# Patient Record
Sex: MALE | Race: WHITE | NOT HISPANIC OR LATINO | Employment: OTHER | URBAN - METROPOLITAN AREA
[De-identification: names, ages, dates, MRNs, and addresses within clinical notes are randomized per-mention and may not be internally consistent; named-entity substitution may affect disease eponyms.]

---

## 2021-11-13 ENCOUNTER — OUTSIDE FACILITY SERVICE (OUTPATIENT)
Dept: CARDIOLOGY | Facility: CLINIC | Age: 56
End: 2021-11-13

## 2021-11-13 PROCEDURE — 93306 TTE W/DOPPLER COMPLETE: CPT | Performed by: INTERNAL MEDICINE

## 2022-05-09 ENCOUNTER — OFFICE VISIT (OUTPATIENT)
Dept: SURGERY | Facility: CLINIC | Age: 57
End: 2022-05-09

## 2022-05-09 VITALS
HEIGHT: 68 IN | WEIGHT: 157.6 LBS | TEMPERATURE: 98.1 F | OXYGEN SATURATION: 98 % | DIASTOLIC BLOOD PRESSURE: 66 MMHG | SYSTOLIC BLOOD PRESSURE: 102 MMHG | HEART RATE: 84 BPM | BODY MASS INDEX: 23.89 KG/M2

## 2022-05-09 DIAGNOSIS — L72.3 INFLAMED SEBACEOUS CYST: Primary | ICD-10-CM

## 2022-05-09 DIAGNOSIS — K43.2 INCISIONAL HERNIA, WITHOUT OBSTRUCTION OR GANGRENE: ICD-10-CM

## 2022-05-09 PROCEDURE — 99203 OFFICE O/P NEW LOW 30 MIN: CPT | Performed by: SURGERY

## 2022-05-09 PROCEDURE — 12032 INTMD RPR S/A/T/EXT 2.6-7.5: CPT | Performed by: SURGERY

## 2022-05-09 PROCEDURE — 11403 EXC TR-EXT B9+MARG 2.1-3CM: CPT | Performed by: SURGERY

## 2022-05-09 RX ORDER — GABAPENTIN 300 MG/1
CAPSULE ORAL
COMMUNITY
Start: 2022-04-19

## 2022-05-09 RX ORDER — FUROSEMIDE 20 MG/1
60 TABLET ORAL DAILY
COMMUNITY
Start: 2022-03-21

## 2022-05-09 RX ORDER — SPIRONOLACTONE 50 MG/1
TABLET, FILM COATED ORAL
COMMUNITY
Start: 2022-04-19

## 2022-05-09 NOTE — PROGRESS NOTES
Patient: Ray Caraballo    YOB: 1965    Date: 05/09/2022    Primary Care Provider: Rupa Russell PA    Chief Complaint   Patient presents with   • Cyst     back       Subjective .     History of present illness: Patient with a several year history of a right valve sebaceous cyst.  Recently it has become aimed at drains every few days.  Causes him some pain.  Patient also with a periumbilical hernia.  He is not complaining of any pain relating to this.  Not having any issues.  Has had this for several years without significant change.    The following portions of the patient's history were reviewed and updated as appropriate: allergies, current medications, past family history, past medical history, past social history, past surgical history and problem list.    Review of Systems   Constitutional: Negative for activity change, chills, fever and unexpected weight change.   HENT: Negative for hearing loss, trouble swallowing and voice change.    Eyes: Negative for visual disturbance.   Respiratory: Negative for apnea, cough, chest tightness, shortness of breath and wheezing.    Cardiovascular: Negative for chest pain, palpitations and leg swelling.   Gastrointestinal: Negative for abdominal distention, abdominal pain, anal bleeding, blood in stool, constipation, diarrhea, nausea, rectal pain and vomiting.   Endocrine: Negative for cold intolerance and heat intolerance.   Genitourinary: Negative for difficulty urinating, dysuria and flank pain.   Musculoskeletal: Negative for back pain and gait problem.   Skin: Positive for color change and wound. Negative for rash.   Neurological: Negative for dizziness, syncope, speech difficulty, weakness, light-headedness, numbness and headaches.   Hematological: Negative for adenopathy. Does not bruise/bleed easily.   Psychiatric/Behavioral: Negative for confusion. The patient is not nervous/anxious.        History:  No past medical history on file.    No  "past surgical history on file.    No family history on file.    Social History     Tobacco Use   • Smoking status: Current Every Day Smoker     Types: Cigarettes   • Smokeless tobacco: Never Used   Substance Use Topics   • Alcohol use: Not Currently   • Drug use: Never        Allergies:  Allergies   Allergen Reactions   • Motrin [Ibuprofen] Rash       Medications:    Current Outpatient Medications:   •  furosemide (LASIX) 20 MG tablet, Take 60 mg by mouth Daily., Disp: , Rfl:   •  gabapentin (NEURONTIN) 300 MG capsule, TAKE ONE CAPSULE TWICE DAILY FOR 30 DAY supply, Disp: , Rfl:   •  spironolactone (ALDACTONE) 50 MG tablet, TAKE 3 TABLETS DAILY FOR ONE TIME DOSE, Disp: , Rfl:     Objective     Vital Signs:   Vitals:    05/09/22 1420   BP: 102/66   Pulse: 84   Temp: 98.1 °F (36.7 °C)   SpO2: 98%   Weight: 71.5 kg (157 lb 9.6 oz)   Height: 172.7 cm (68\")       Physical Exam:  General Appearance:    Alert, cooperative, in no acute distress   Head:    Normocephalic, without obvious abnormality, atraumatic   Eyes:            Normal.  No scleral icterus.  PERRLA    Lungs:     Clear to auscultation,respirations regular, even and                  unlabored    Heart:    Regular rhythm and normal rate, normal S1 and S2,   Abdomen:    Soft and nontender.  Incisional periumbilical hernia that is easily reducible relatively small with omental contents.   Extremities:   Moves all extremities well, no edema, no cyanosis, no             redness   Skin:  3 cm inflamed sebaceous cyst of the right mid back   Neurologic:   Normal without gross deficits.   Psychiatric: No evidence of depression or anxiety          Results Review:   None    Review of Systems was reviewed and confirmed as accurate as documented by the MA.    Assessment/Plan     1. Inflamed sebaceous cyst    2. Incisional hernia, without obstruction or gangrene      Patient with incisional periumbilical hernia.  Relatively small currently.  I explained to him the risks of " this hernia which includes intestinal incarceration/strangulation.  This is a small risk currently given its size.  I offered him repair of the hernia.  For now he wishes to observe and I think this is reasonable since it is asymptomatic.  He understands to follow-up with us if it begins to hurt, significantly increases in size or he is unable to reduce the hernia.    Inflamed sebaceous cyst of the back.  I offered to remove this for him as this will not resolve on its own.  He understands the procedure as well as the risk of bleeding and infection and he wishes to proceed.      1% lidocaine was used locally after the area was prepped and draped in usual fashion.  An elliptical incision was then made around the cyst and full-thickness excision was performed.  Any inflammatory tissue was also excised.  Excision measured 2.3 cm.  The wound was partially closed in layers using interrupted deep dermal Vicryl closure and interrupted simple nylon closure for the skin and measured 3 cm in length.  It was left open in the very inferior portion for drainage.  Patient tolerated procedure well there were no complications.  Wound care instructions were given.  There was minimal blood loss and hemostasis had been well controlled.  Follow-up in 1 week.    Electronically signed by Dorian Albarran MD  05/09/22  14:29 EDT

## 2022-05-13 NOTE — PROGRESS NOTES
"Patient: Ray Caraballo    YOB: 1965    Date: 05/16/2022    Primary Care Provider: Rupa Russell PA    Chief Complaint   Patient presents with   • Post-op Follow-up     Back excision       History of present illness: Patient doing well after his procedure.  No complaints.  No pain.  Feels much better than prior to excision    Vital Signs:  Vitals:    05/16/22 1347   BP: 120/78   BP Location: Left arm   Patient Position: Sitting   Cuff Size: Adult   Pulse: 78   Resp: 12   Temp: 97.1 °F (36.2 °C)   SpO2: 99%   Weight: 72.1 kg (159 lb)   Height: 172.7 cm (68\")       Physical Exam:   General Appearance:    Alert, cooperative, in no acute distress, wound clean dry without infection.  Suture was removed       Assessment / Plan:    1. Postoperative visit        Overall doing well.  Having no issues.  He will follow-up with me as needed.    Electronically signed by Dorian Albarran MD  05/16/22                      "

## 2022-05-16 ENCOUNTER — OFFICE VISIT (OUTPATIENT)
Dept: SURGERY | Facility: CLINIC | Age: 57
End: 2022-05-16

## 2022-05-16 VITALS
TEMPERATURE: 97.1 F | HEIGHT: 68 IN | BODY MASS INDEX: 24.1 KG/M2 | WEIGHT: 159 LBS | SYSTOLIC BLOOD PRESSURE: 120 MMHG | OXYGEN SATURATION: 99 % | DIASTOLIC BLOOD PRESSURE: 78 MMHG | RESPIRATION RATE: 12 BRPM | HEART RATE: 78 BPM

## 2022-05-16 DIAGNOSIS — Z48.89 POSTOPERATIVE VISIT: Primary | ICD-10-CM

## 2022-05-16 PROCEDURE — 99024 POSTOP FOLLOW-UP VISIT: CPT | Performed by: SURGERY
